# Patient Record
Sex: FEMALE | Race: WHITE | NOT HISPANIC OR LATINO | Employment: OTHER | URBAN - METROPOLITAN AREA
[De-identification: names, ages, dates, MRNs, and addresses within clinical notes are randomized per-mention and may not be internally consistent; named-entity substitution may affect disease eponyms.]

---

## 2017-01-05 ENCOUNTER — GENERIC CONVERSION - ENCOUNTER (OUTPATIENT)
Dept: OTHER | Facility: OTHER | Age: 62
End: 2017-01-05

## 2017-01-05 ENCOUNTER — LAB REQUISITION (OUTPATIENT)
Dept: LAB | Facility: HOSPITAL | Age: 62
End: 2017-01-05
Payer: COMMERCIAL

## 2017-01-05 ENCOUNTER — ALLSCRIPTS OFFICE VISIT (OUTPATIENT)
Dept: OTHER | Facility: OTHER | Age: 62
End: 2017-01-05

## 2017-01-05 ENCOUNTER — APPOINTMENT (OUTPATIENT)
Dept: LAB | Facility: HOSPITAL | Age: 62
End: 2017-01-05
Payer: COMMERCIAL

## 2017-01-05 DIAGNOSIS — N95.0 POSTMENOPAUSAL BLEEDING: ICD-10-CM

## 2017-01-05 DIAGNOSIS — R31.9 HEMATURIA: ICD-10-CM

## 2017-01-05 PROCEDURE — 88112 CYTOPATH CELL ENHANCE TECH: CPT

## 2017-01-05 PROCEDURE — 87147 CULTURE TYPE IMMUNOLOGIC: CPT

## 2017-01-05 PROCEDURE — 88305 TISSUE EXAM BY PATHOLOGIST: CPT | Performed by: PHYSICIAN ASSISTANT

## 2017-01-05 PROCEDURE — 87086 URINE CULTURE/COLONY COUNT: CPT

## 2017-01-06 ENCOUNTER — HOSPITAL ENCOUNTER (OUTPATIENT)
Dept: RADIOLOGY | Facility: HOSPITAL | Age: 62
Discharge: HOME | End: 2017-01-06
Payer: COMMERCIAL

## 2017-01-06 DIAGNOSIS — N95.0 POSTMENOPAUSAL BLEEDING: ICD-10-CM

## 2017-01-06 PROCEDURE — 76830 TRANSVAGINAL US NON-OB: CPT

## 2017-01-06 PROCEDURE — 76856 US EXAM PELVIC COMPLETE: CPT

## 2017-01-08 LAB — BACTERIA UR CULT: NORMAL

## 2017-01-09 ENCOUNTER — GENERIC CONVERSION - ENCOUNTER (OUTPATIENT)
Dept: OTHER | Facility: OTHER | Age: 62
End: 2017-01-09

## 2017-01-12 ENCOUNTER — GENERIC CONVERSION - ENCOUNTER (OUTPATIENT)
Dept: OTHER | Facility: OTHER | Age: 62
End: 2017-01-12

## 2017-01-12 ENCOUNTER — ALLSCRIPTS OFFICE VISIT (OUTPATIENT)
Dept: OTHER | Facility: OTHER | Age: 62
End: 2017-01-12

## 2017-01-13 ENCOUNTER — GENERIC CONVERSION - ENCOUNTER (OUTPATIENT)
Dept: OTHER | Facility: OTHER | Age: 62
End: 2017-01-13

## 2017-04-27 RX ORDER — SENNOSIDES 8.6 MG
650 CAPSULE ORAL EVERY 8 HOURS PRN
COMMUNITY
End: 2020-07-14

## 2017-05-01 ENCOUNTER — ANESTHESIA EVENT (OUTPATIENT)
Dept: PERIOP | Facility: HOSPITAL | Age: 62
End: 2017-05-01
Payer: COMMERCIAL

## 2017-05-02 ENCOUNTER — HOSPITAL ENCOUNTER (OUTPATIENT)
Facility: HOSPITAL | Age: 62
Setting detail: OBSERVATION
Discharge: HOME | End: 2017-05-03
Attending: OBSTETRICS & GYNECOLOGY | Admitting: OBSTETRICS & GYNECOLOGY
Payer: COMMERCIAL

## 2017-05-02 ENCOUNTER — ANESTHESIA (OUTPATIENT)
Dept: PERIOP | Facility: HOSPITAL | Age: 62
End: 2017-05-02
Payer: COMMERCIAL

## 2017-05-02 LAB
ABO GROUP BLD: NORMAL
BLD GP AB SCN SERPL QL: NEGATIVE
EST. AVERAGE GLUCOSE BLD GHB EST-MCNC: 103 MG/DL
GLUCOSE SERPL-MCNC: 97 MG/DL (ref 65–140)
HBA1C MFR BLD: 5.2 % (ref 4.2–6.3)
RH BLD: POSITIVE
SPECIMEN EXPIRATION DATE: NORMAL

## 2017-05-02 PROCEDURE — 86901 BLOOD TYPING SEROLOGIC RH(D): CPT | Performed by: OBSTETRICS & GYNECOLOGY

## 2017-05-02 PROCEDURE — C1781 MESH (IMPLANTABLE): HCPCS | Performed by: OBSTETRICS & GYNECOLOGY

## 2017-05-02 PROCEDURE — C1771 REP DEV, URINARY, W/SLING: HCPCS | Performed by: OBSTETRICS & GYNECOLOGY

## 2017-05-02 PROCEDURE — 83036 HEMOGLOBIN GLYCOSYLATED A1C: CPT | Performed by: OBSTETRICS & GYNECOLOGY

## 2017-05-02 PROCEDURE — 86900 BLOOD TYPING SEROLOGIC ABO: CPT | Performed by: OBSTETRICS & GYNECOLOGY

## 2017-05-02 PROCEDURE — 82948 REAGENT STRIP/BLOOD GLUCOSE: CPT

## 2017-05-02 PROCEDURE — 86850 RBC ANTIBODY SCREEN: CPT | Performed by: OBSTETRICS & GYNECOLOGY

## 2017-05-02 DEVICE — VAGINAL SUPPORT SYSTEM
Type: IMPLANTABLE DEVICE | Site: VAGINA | Status: FUNCTIONAL
Brand: UPHOLD™ LITE

## 2017-05-02 DEVICE — SLING TRNVG MID URETHRAL ADVANTAGE BLUE: Type: IMPLANTABLE DEVICE | Site: VAGINA | Status: FUNCTIONAL

## 2017-05-02 RX ORDER — ONDANSETRON 2 MG/ML
4 INJECTION INTRAMUSCULAR; INTRAVENOUS EVERY 6 HOURS PRN
Status: DISCONTINUED | OUTPATIENT
Start: 2017-05-02 | End: 2017-05-03 | Stop reason: HOSPADM

## 2017-05-02 RX ORDER — FENTANYL CITRATE 50 UG/ML
INJECTION, SOLUTION INTRAMUSCULAR; INTRAVENOUS AS NEEDED
Status: DISCONTINUED | OUTPATIENT
Start: 2017-05-02 | End: 2017-05-02 | Stop reason: SURG

## 2017-05-02 RX ORDER — MIDAZOLAM HYDROCHLORIDE 1 MG/ML
INJECTION INTRAMUSCULAR; INTRAVENOUS AS NEEDED
Status: DISCONTINUED | OUTPATIENT
Start: 2017-05-02 | End: 2017-05-02 | Stop reason: SURG

## 2017-05-02 RX ORDER — PROPOFOL 10 MG/ML
INJECTION, EMULSION INTRAVENOUS CONTINUOUS PRN
Status: DISCONTINUED | OUTPATIENT
Start: 2017-05-02 | End: 2017-05-02 | Stop reason: SURG

## 2017-05-02 RX ORDER — IBUPROFEN 600 MG/1
600 TABLET ORAL EVERY 6 HOURS PRN
Status: DISCONTINUED | OUTPATIENT
Start: 2017-05-02 | End: 2017-05-03 | Stop reason: HOSPADM

## 2017-05-02 RX ORDER — FENTANYL CITRATE 50 UG/ML
50 INJECTION, SOLUTION INTRAMUSCULAR; INTRAVENOUS
Status: DISCONTINUED | OUTPATIENT
Start: 2017-05-02 | End: 2017-05-02 | Stop reason: HOSPADM

## 2017-05-02 RX ORDER — ONDANSETRON 2 MG/ML
4 INJECTION INTRAMUSCULAR; INTRAVENOUS EVERY 6 HOURS PRN
Status: DISCONTINUED | OUTPATIENT
Start: 2017-05-02 | End: 2017-05-02 | Stop reason: HOSPADM

## 2017-05-02 RX ORDER — LIDOCAINE HYDROCHLORIDE AND EPINEPHRINE 20; 5 MG/ML; UG/ML
INJECTION, SOLUTION EPIDURAL; INFILTRATION; INTRACAUDAL; PERINEURAL AS NEEDED
Status: DISCONTINUED | OUTPATIENT
Start: 2017-05-02 | End: 2017-05-02 | Stop reason: SURG

## 2017-05-02 RX ORDER — SODIUM CHLORIDE 9 MG/ML
125 INJECTION, SOLUTION INTRAVENOUS CONTINUOUS
Status: DISCONTINUED | OUTPATIENT
Start: 2017-05-02 | End: 2017-05-03 | Stop reason: HOSPADM

## 2017-05-02 RX ORDER — MAGNESIUM HYDROXIDE 1200 MG/15ML
LIQUID ORAL AS NEEDED
Status: DISCONTINUED | OUTPATIENT
Start: 2017-05-02 | End: 2017-05-02 | Stop reason: HOSPADM

## 2017-05-02 RX ORDER — ONDANSETRON 2 MG/ML
INJECTION INTRAMUSCULAR; INTRAVENOUS AS NEEDED
Status: DISCONTINUED | OUTPATIENT
Start: 2017-05-02 | End: 2017-05-02 | Stop reason: SURG

## 2017-05-02 RX ORDER — DOCUSATE SODIUM 100 MG/1
100 CAPSULE, LIQUID FILLED ORAL 2 TIMES DAILY
Status: DISCONTINUED | OUTPATIENT
Start: 2017-05-02 | End: 2017-05-03 | Stop reason: HOSPADM

## 2017-05-02 RX ORDER — SODIUM CHLORIDE, SODIUM LACTATE, POTASSIUM CHLORIDE, CALCIUM CHLORIDE 600; 310; 30; 20 MG/100ML; MG/100ML; MG/100ML; MG/100ML
125 INJECTION, SOLUTION INTRAVENOUS CONTINUOUS
Status: DISCONTINUED | OUTPATIENT
Start: 2017-05-02 | End: 2017-05-03 | Stop reason: HOSPADM

## 2017-05-02 RX ORDER — ACETAMINOPHEN 325 MG/1
650 TABLET ORAL EVERY 6 HOURS PRN
Status: DISCONTINUED | OUTPATIENT
Start: 2017-05-02 | End: 2017-05-03 | Stop reason: HOSPADM

## 2017-05-02 RX ADMIN — SODIUM CHLORIDE 125 ML/HR: 0.9 INJECTION, SOLUTION INTRAVENOUS at 10:54

## 2017-05-02 RX ADMIN — LIDOCAINE HYDROCHLORIDE AND EPINEPHRINE 5 ML: 20; 5 INJECTION, SOLUTION EPIDURAL; INFILTRATION; INTRACAUDAL; PERINEURAL at 14:28

## 2017-05-02 RX ADMIN — ONDANSETRON HYDROCHLORIDE 4 MG: 2 INJECTION, SOLUTION INTRAVENOUS at 15:30

## 2017-05-02 RX ADMIN — MIDAZOLAM HYDROCHLORIDE 0.5 MG: 1 INJECTION, SOLUTION INTRAMUSCULAR; INTRAVENOUS at 13:34

## 2017-05-02 RX ADMIN — MIDAZOLAM HYDROCHLORIDE 2 MG: 1 INJECTION, SOLUTION INTRAMUSCULAR; INTRAVENOUS at 12:47

## 2017-05-02 RX ADMIN — ONDANSETRON 4 MG: 2 INJECTION INTRAMUSCULAR; INTRAVENOUS at 16:48

## 2017-05-02 RX ADMIN — FENTANYL CITRATE 100 MCG: 50 INJECTION, SOLUTION INTRAMUSCULAR; INTRAVENOUS at 12:47

## 2017-05-02 RX ADMIN — PROPOFOL 100 MCG/KG/MIN: 10 INJECTION, EMULSION INTRAVENOUS at 13:10

## 2017-05-02 RX ADMIN — MIDAZOLAM HYDROCHLORIDE 1 MG: 1 INJECTION, SOLUTION INTRAMUSCULAR; INTRAVENOUS at 13:10

## 2017-05-02 RX ADMIN — CEFAZOLIN SODIUM 1000 MG: 1 SOLUTION INTRAVENOUS at 13:03

## 2017-05-02 RX ADMIN — LIDOCAINE HYDROCHLORIDE AND EPINEPHRINE 5 ML: 20; 5 INJECTION, SOLUTION EPIDURAL; INFILTRATION; INTRACAUDAL; PERINEURAL at 15:13

## 2017-05-02 RX ADMIN — IBUPROFEN 600 MG: 600 TABLET, FILM COATED ORAL at 21:53

## 2017-05-02 RX ADMIN — SODIUM CHLORIDE, SODIUM LACTATE, POTASSIUM CHLORIDE, AND CALCIUM CHLORIDE 125 ML/HR: .6; .31; .03; .02 INJECTION, SOLUTION INTRAVENOUS at 16:22

## 2017-05-02 RX ADMIN — MIDAZOLAM HYDROCHLORIDE 0.5 MG: 1 INJECTION, SOLUTION INTRAMUSCULAR; INTRAVENOUS at 13:27

## 2017-05-02 RX ADMIN — SODIUM CHLORIDE: 0.9 INJECTION, SOLUTION INTRAVENOUS at 13:47

## 2017-05-02 RX ADMIN — DOCUSATE SODIUM 100 MG: 100 CAPSULE, LIQUID FILLED ORAL at 18:09

## 2017-05-03 VITALS
OXYGEN SATURATION: 99 % | BODY MASS INDEX: 18.48 KG/M2 | WEIGHT: 115 LBS | HEART RATE: 84 BPM | DIASTOLIC BLOOD PRESSURE: 78 MMHG | RESPIRATION RATE: 16 BRPM | TEMPERATURE: 98 F | HEIGHT: 66 IN | SYSTOLIC BLOOD PRESSURE: 127 MMHG

## 2017-05-03 PROBLEM — N81.6 RECTOCELE: Status: ACTIVE | Noted: 2017-05-03

## 2017-05-03 PROBLEM — N36.41 HYPERMOBILITY OF URETHRA: Status: ACTIVE | Noted: 2017-05-03

## 2017-05-03 PROBLEM — N81.11 CYSTOCELE, MIDLINE: Status: ACTIVE | Noted: 2017-05-03

## 2017-05-03 LAB
ANION GAP SERPL CALCULATED.3IONS-SCNC: 8 MMOL/L (ref 4–13)
BASOPHILS # BLD AUTO: 0.03 THOUSANDS/ΜL (ref 0–0.1)
BASOPHILS NFR BLD AUTO: 0 % (ref 0–1)
BUN SERPL-MCNC: 14 MG/DL (ref 5–25)
CALCIUM SERPL-MCNC: 9.5 MG/DL (ref 8.3–10.1)
CHLORIDE SERPL-SCNC: 108 MMOL/L (ref 100–108)
CO2 SERPL-SCNC: 25 MMOL/L (ref 21–32)
CREAT SERPL-MCNC: 0.55 MG/DL (ref 0.6–1.3)
EOSINOPHIL # BLD AUTO: 0.01 THOUSAND/ΜL (ref 0–0.61)
EOSINOPHIL NFR BLD AUTO: 0 % (ref 0–6)
ERYTHROCYTE [DISTWIDTH] IN BLOOD BY AUTOMATED COUNT: 13.1 % (ref 11.6–15.1)
GFR SERPL CREATININE-BSD FRML MDRD: >60 ML/MIN/1.73SQ M
GLUCOSE SERPL-MCNC: 104 MG/DL (ref 65–140)
HCT VFR BLD AUTO: 35.8 % (ref 34.8–46.1)
HGB BLD-MCNC: 11.5 G/DL (ref 11.5–15.4)
LYMPHOCYTES # BLD AUTO: 1.54 THOUSANDS/ΜL (ref 0.6–4.47)
LYMPHOCYTES NFR BLD AUTO: 16 % (ref 14–44)
MCH RBC QN AUTO: 30.8 PG (ref 26.8–34.3)
MCHC RBC AUTO-ENTMCNC: 32.1 G/DL (ref 31.4–37.4)
MCV RBC AUTO: 96 FL (ref 82–98)
MONOCYTES # BLD AUTO: 1.04 THOUSAND/ΜL (ref 0.17–1.22)
MONOCYTES NFR BLD AUTO: 11 % (ref 4–12)
NEUTROPHILS # BLD AUTO: 7.25 THOUSANDS/ΜL (ref 1.85–7.62)
NEUTS SEG NFR BLD AUTO: 73 % (ref 43–75)
NRBC BLD AUTO-RTO: 0 /100 WBCS
PLATELET # BLD AUTO: 160 THOUSANDS/UL (ref 149–390)
PMV BLD AUTO: 12.8 FL (ref 8.9–12.7)
POTASSIUM SERPL-SCNC: 4.2 MMOL/L (ref 3.5–5.3)
RBC # BLD AUTO: 3.73 MILLION/UL (ref 3.81–5.12)
SODIUM SERPL-SCNC: 141 MMOL/L (ref 136–145)
WBC # BLD AUTO: 9.87 THOUSAND/UL (ref 4.31–10.16)

## 2017-05-03 PROCEDURE — 80048 BASIC METABOLIC PNL TOTAL CA: CPT | Performed by: OBSTETRICS & GYNECOLOGY

## 2017-05-03 PROCEDURE — 85025 COMPLETE CBC W/AUTO DIFF WBC: CPT | Performed by: OBSTETRICS & GYNECOLOGY

## 2017-05-03 RX ADMIN — IBUPROFEN 600 MG: 600 TABLET, FILM COATED ORAL at 05:53

## 2017-05-03 RX ADMIN — SODIUM CHLORIDE, SODIUM LACTATE, POTASSIUM CHLORIDE, AND CALCIUM CHLORIDE 125 ML/HR: .6; .31; .03; .02 INJECTION, SOLUTION INTRAVENOUS at 00:18

## 2017-05-03 RX ADMIN — DOCUSATE SODIUM 100 MG: 100 CAPSULE, LIQUID FILLED ORAL at 09:00

## 2018-01-12 VITALS
SYSTOLIC BLOOD PRESSURE: 124 MMHG | WEIGHT: 121 LBS | BODY MASS INDEX: 20.16 KG/M2 | HEIGHT: 65 IN | DIASTOLIC BLOOD PRESSURE: 78 MMHG

## 2018-01-12 NOTE — MISCELLANEOUS
Message   Recorded as Task   Date: 01/09/2017 12:01 PM, Created By: Suki Lawrence   Task Name: Go to Result   Assigned To: Ebony Munroe   Regarding Patient: Vikki Jo, Status: Active   Comment:    MessiMimi paredes - 09 Jan 2017 12:01 PM     TASK CREATED  please let Catalino Dc know that her pelvic ultrasound is normal - the lining of her uterus is very thin, which is what we wanted to find  I am still awaiting her endometrial biopsy results and urine testing  Tracy Hewitt - 09 Jan 2017 12:54 PM     TASK EDITED  I lm of this for pt        Active Problems    1  Encounter for gynecological examination without abnormal finding (V72 31) (Z01 419)   2  Encounter for screening mammogram for malignant neoplasm of breast (V76 12)   (Z12 31)   3  Hematuria (599 70) (R31 9)   4  Postmenopausal Atrophic Vaginitis (627 3)   5  Postmenopausal bleeding (627 1) (N95 0)    Current Meds   1  Osphena 60 MG Oral Tablet; Take 1 tablet daily; Therapy: 81Xty2718 to (Evaluate:55Sbd9585)  Requested for: 13Khh0725; Last   Rx:[id number] Ordered    Allergies    1  No Known Drug Allergies    Signatures   Electronically signed by :  Yancy Huggins, ; Jan 9 2017 12:54PM EST                       (Author)

## 2018-01-13 VITALS
BODY MASS INDEX: 19.83 KG/M2 | SYSTOLIC BLOOD PRESSURE: 116 MMHG | DIASTOLIC BLOOD PRESSURE: 68 MMHG | WEIGHT: 119 LBS | HEIGHT: 65 IN

## 2018-01-14 NOTE — MISCELLANEOUS
Message   Recorded as Task   Date: 01/09/2017 12:44 PM, Created By: Amberly Loyola   Task Name: Go to Result   Assigned To: ABDIRASHID GYN,Team   Regarding Patient: Aisha Carbajal, Status: In Progress   Comment:    Mimi Chamberlain - 09 Jan 2017 12:44 PM     TASK CREATED  marlene's urine testing is all normal  Please let her know  She should let me know if she has any further bleeding  Thanks! Marco Faith - 09 Jan 2017 1:10 PM     TASK IN PROGRESS   Marco Faith - 09 Jan 2017 1:11 PM     TASK EDITED  I lm of this also        Active Problems    1  Encounter for gynecological examination without abnormal finding (V72 31) (Z01 419)   2  Encounter for screening mammogram for malignant neoplasm of breast (V76 12)   (Z12 31)   3  Hematuria (599 70) (R31 9)   4  Postmenopausal Atrophic Vaginitis (627 3)   5  Postmenopausal bleeding (627 1) (N95 0)    Current Meds   1  Osphena 60 MG Oral Tablet; Take 1 tablet daily; Therapy: 72Cta1451 to (Evaluate:90Epn6889)  Requested for: 18Gca8270; Last   Rx:[id number] Ordered    Allergies    1  No Known Drug Allergies    Signatures   Electronically signed by :  Carmenza Huggins, ; Jan 9 2017  1:11PM EST                       (Author)

## 2018-01-15 NOTE — MISCELLANEOUS
Message   Recorded as Task   Date: 01/12/2017 03:06 PM, Created By: Clinton Cash   Task Name: Care Coordination   Assigned To: ABDIRASHID GYN,Team   Regarding Patient: Yesenia Wing, Status: In Progress   Comment:    Clinton Cash - 12 Jan 2017 3:06 PM     TASK CREATED  pt called and said pessary fell out   Scot Cipro - 12 Jan 2017 3:06 PM     TASK IN PROGRESS   Scot Cipro - 12 Jan 2017 3:13 PM     TASK EDITED  Per Anabel Barron, pt will try to push prolapse back in and reinsert pessary  I gave her instructions and she is willing to try  If it falls out again, pt willing to have another pessary reinserted tomorrow  Pt is seeing someone in [facility] office   His # is [phone]  [facility] may call to see if pt can be seen sooner  Scot Cipro - 12 Jan 2017 3:14 PM     TASK EDITED  Nurses note made - not sure if you want to call Mimi Valentin - 13 Jan 2017 8:25 AM     TASK REPLIED TO: Previously Assigned To Mimi Chamberlain  can you please let pt know that I got her in for a 2pm appt on Monday - same location and same doctor at [place] [address] - and she needs to arrive early and bring her completed paperwork with her  Thanks! Evelyn Patrick - 13 Jan 2017 10:22 AM     TASK EDITED  pt is informed        Active Problems    1  Cystocele (618 01) (N81 10)   2  Encounter for gynecological examination without abnormal finding (V72 31) (Z01 419)   3  Encounter for screening mammogram for malignant neoplasm of breast (V76 12)   (Z12 31)   4  Hematuria (599 70) (R31 9)   5  Postmenopausal Atrophic Vaginitis (627 3)   6  Postmenopausal bleeding (627 1) (N95 0)    Current Meds   1  Osphena 60 MG Oral Tablet; Take 1 tablet daily; Therapy: 14Ccg9160 to (Evaluate:35Oqf8301)  Requested for: 13Hnh3244; Last   Rx:[id number] Ordered    Allergies    1   No Known Drug Allergies    Signatures   Electronically signed by : Stefan Ritchie, ; Jan 13 2017 10:22AM EST                       (Author)

## 2018-01-16 NOTE — MISCELLANEOUS
Message   Recorded as Task   Date: 01/12/2017 03:06 PM, Created By: Juan Ayala   Task Name: Care Coordination   Assigned To: ABDIRASHID GYN,Team   Regarding Patient: Rigo Chester, Status: In Progress   Comment:    Juan Ayala - 12 Jan 2017 3:06 PM     TASK CREATED  pt called and said pessary fell out   Juan Ayala - 12 Jan 2017 3:06 PM     TASK IN PROGRESS   Juan Ayala - 12 Jan 2017 3:13 PM     TASK EDITED  Per Lorenza Browne, pt will try to push prolapse back in and reinsert pessary  I gave her instructions and she is willing to try  If it falls out again, pt willing to have another pessary reinserted tomorrow  Pt is seeing someone in [facility] office   His # is [phone]  [facility] may call to see if pt can be seen sooner  Active Problems    1  Cystocele (618 01) (N81 10)   2  Encounter for gynecological examination without abnormal finding (V72 31) (Z01 419)   3  Encounter for screening mammogram for malignant neoplasm of breast (V76 12)   (Z12 31)   4  Hematuria (599 70) (R31 9)   5  Postmenopausal Atrophic Vaginitis (627 3)   6  Postmenopausal bleeding (627 1) (N95 0)    Current Meds   1  Osphena 60 MG Oral Tablet; Take 1 tablet daily; Therapy: 93Zqf6598 to (Evaluate:18Jbn7160)  Requested for: 13Rgu2413; Last   Rx:[id number] Ordered    Allergies    1  No Known Drug Allergies    Signatures   Electronically signed by :  Leslie Huggins, ; Jan 12 2017  3:13PM EST                       (Author)

## 2018-01-16 NOTE — MISCELLANEOUS
Message   Recorded as Task   Date: 01/05/2017 08:20 AM, Created By: Ruben Pascual   Task Name: Call Back   Assigned To: ABDIRASHID GYN,Team   Regarding Patient: Korin Farrell, Status: In Progress   Comment:    Rea Samson - 05 Jan 2017 8:20 AM     TASK CREATED  Caller: Self; Other; [phone] (Home)  w87 is prov,  pt started spotting mon 1/2/17, has low abdominal pressure, bleeding heavy yesterday & today,  pls call pt @ [address] [place] [address] - 05 Jan 2017 8:41 AM     TASK IN PROGRESS   Romeo Burciaga - 05 Jan 2017 8:50 AM     TASK EDITED  Pt had spotting on Mon and Tues - heavier bleeding off and on yesterday and today  Pts prolapse is hanging out  I gave her an ov - not sure if I should have gotten u/s and waited for ov??? Sorry        Active Problems    1  Encounter for gynecological examination without abnormal finding (V72 31) (Z01 419)   2  Encounter for screening mammogram for malignant neoplasm of breast (V76 12)   (Z12 31)   3  Postmenopausal Atrophic Vaginitis (627 3)    Current Meds   1  Osphena 60 MG Oral Tablet; Take 1 tablet daily; Therapy: 75Mew0226 to (Evaluate:45Toy4659)  Requested for: 46Vpj0061; Last   Rx:[id number] Ordered    Allergies    1  No Known Drug Allergies    Signatures   Electronically signed by :  Peg Huggins, ; Jan 5 2017 10:09AM EST                       (Author)

## 2018-01-17 NOTE — MISCELLANEOUS
Message   Recorded as Task   Date: 01/05/2017 08:20 AM, Created By: Kevin Monson   Task Name: Call Back   Assigned To: ABDIRASHID GYN,Team   Regarding Patient: Nicholas Graves, Status: In Progress   Comment:    Rea Samson - 05 Jan 2017 8:20 AM     TASK CREATED  Caller: Self; Other; [phone] (Home)  w87 is prov,  pt started spotting mon 1/2/17, has low abdominal pressure, bleeding heavy yesterday & today,  pls call pt @ [address] [place] [address] - 05 Jan 2017 8:41 AM     TASK IN PROGRESS   Valdemar Rodriguez - 05 Jan 2017 8:50 AM     TASK EDITED  Pt had spotting on Mon and Tues - heavier bleeding off and on yesterday and today  Pts prolapse is hanging out  I gave her an ov - not sure if I should have gotten u/s and waited for ov??? Sorry        Active Problems    1  Encounter for gynecological examination without abnormal finding (V72 31) (Z01 419)   2  Encounter for screening mammogram for malignant neoplasm of breast (V76 12)   (Z12 31)   3  Postmenopausal Atrophic Vaginitis (627 3)    Current Meds   1  Osphena 60 MG Oral Tablet; Take 1 tablet daily; Therapy: 31Okc2162 to (Evaluate:16Nin9793)  Requested for: 00Odq1118; Last   Rx:[id number] Ordered    Allergies    1  No Known Drug Allergies    Signatures   Electronically signed by :  Judith Huggins, ; Jan 5 2017  8:50AM EST                       (Author)

## 2018-08-14 ENCOUNTER — TRANSCRIBE ORDERS (OUTPATIENT)
Dept: ADMINISTRATIVE | Facility: HOSPITAL | Age: 63
End: 2018-08-14

## 2018-08-14 ENCOUNTER — APPOINTMENT (OUTPATIENT)
Dept: LAB | Facility: HOSPITAL | Age: 63
End: 2018-08-14
Payer: COMMERCIAL

## 2018-08-14 DIAGNOSIS — Z13.9 SCREENING FOR CONDITION: ICD-10-CM

## 2018-08-14 DIAGNOSIS — R53.83 FATIGUE, UNSPECIFIED TYPE: ICD-10-CM

## 2018-08-14 DIAGNOSIS — Z13.9 SCREENING FOR CONDITION: Primary | ICD-10-CM

## 2018-08-14 LAB
25(OH)D3 SERPL-MCNC: 38.3 NG/ML (ref 30–100)
ALBUMIN SERPL BCP-MCNC: 4.3 G/DL (ref 3–5.2)
ALP SERPL-CCNC: 97 U/L (ref 43–122)
ALT SERPL W P-5'-P-CCNC: 26 U/L (ref 9–52)
ANION GAP SERPL CALCULATED.3IONS-SCNC: 6 MMOL/L (ref 5–14)
AST SERPL W P-5'-P-CCNC: 26 U/L (ref 14–36)
BASOPHILS # BLD AUTO: 0.1 THOUSANDS/ΜL (ref 0–0.1)
BASOPHILS NFR BLD AUTO: 1 % (ref 0–1)
BILIRUB SERPL-MCNC: 0.4 MG/DL
BUN SERPL-MCNC: 19 MG/DL (ref 5–25)
CALCIUM ALBUM COR SERPL-MCNC: 10.7 MG/DL (ref 8.3–10.1)
CALCIUM SERPL-MCNC: 10.9 MG/DL (ref 8.4–10.2)
CHLORIDE SERPL-SCNC: 103 MMOL/L (ref 97–108)
CHOLEST SERPL-MCNC: 175 MG/DL
CO2 SERPL-SCNC: 32 MMOL/L (ref 22–30)
CREAT SERPL-MCNC: 0.67 MG/DL (ref 0.6–1.2)
EOSINOPHIL # BLD AUTO: 0.1 THOUSAND/ΜL (ref 0–0.4)
EOSINOPHIL NFR BLD AUTO: 2 % (ref 0–6)
ERYTHROCYTE [DISTWIDTH] IN BLOOD BY AUTOMATED COUNT: 12.2 %
GFR SERPL CREATININE-BSD FRML MDRD: 95 ML/MIN/1.73SQ M
GLUCOSE P FAST SERPL-MCNC: 91 MG/DL (ref 70–99)
HCT VFR BLD AUTO: 43.6 % (ref 36–46)
HDLC SERPL-MCNC: 48 MG/DL (ref 40–59)
HGB BLD-MCNC: 14.5 G/DL (ref 12–16)
LDLC SERPL CALC-MCNC: 95 MG/DL
LYMPHOCYTES # BLD AUTO: 1.9 THOUSANDS/ΜL (ref 0.5–4)
LYMPHOCYTES NFR BLD AUTO: 29 % (ref 20–50)
MCH RBC QN AUTO: 31.9 PG (ref 26–34)
MCHC RBC AUTO-ENTMCNC: 33.2 G/DL (ref 31–36)
MCV RBC AUTO: 96 FL (ref 80–100)
MONOCYTES # BLD AUTO: 0.5 THOUSAND/ΜL (ref 0.2–0.9)
MONOCYTES NFR BLD AUTO: 8 % (ref 1–10)
NEUTROPHILS # BLD AUTO: 3.8 THOUSANDS/ΜL (ref 1.8–7.8)
NEUTS SEG NFR BLD AUTO: 60 % (ref 45–65)
NONHDLC SERPL-MCNC: 127 MG/DL
PLATELET # BLD AUTO: 177 THOUSANDS/UL (ref 150–450)
PLATELET BLD QL SMEAR: ADEQUATE
PMV BLD AUTO: 11.5 FL (ref 8.9–12.7)
POTASSIUM SERPL-SCNC: 5.2 MMOL/L (ref 3.6–5)
PROT SERPL-MCNC: 7.7 G/DL (ref 5.9–8.4)
RBC # BLD AUTO: 4.55 MILLION/UL (ref 4–5.2)
RBC MORPH BLD: NORMAL
SODIUM SERPL-SCNC: 141 MMOL/L (ref 137–147)
TRIGL SERPL-MCNC: 158 MG/DL
TSH SERPL DL<=0.05 MIU/L-ACNC: 0.7 UIU/ML (ref 0.47–4.68)
WBC # BLD AUTO: 6.4 THOUSAND/UL (ref 4.5–11)

## 2018-08-14 PROCEDURE — 85025 COMPLETE CBC W/AUTO DIFF WBC: CPT

## 2018-08-14 PROCEDURE — 80061 LIPID PANEL: CPT

## 2018-08-14 PROCEDURE — 36415 COLL VENOUS BLD VENIPUNCTURE: CPT

## 2018-08-14 PROCEDURE — 80053 COMPREHEN METABOLIC PANEL: CPT

## 2018-08-14 PROCEDURE — 84443 ASSAY THYROID STIM HORMONE: CPT

## 2018-08-14 PROCEDURE — 82306 VITAMIN D 25 HYDROXY: CPT

## 2018-09-21 ENCOUNTER — TRANSCRIBE ORDERS (OUTPATIENT)
Dept: ADMINISTRATIVE | Facility: HOSPITAL | Age: 63
End: 2018-09-21

## 2018-09-21 ENCOUNTER — APPOINTMENT (OUTPATIENT)
Dept: LAB | Facility: HOSPITAL | Age: 63
End: 2018-09-21
Payer: COMMERCIAL

## 2018-09-21 DIAGNOSIS — E83.52 HYPERCALCEMIA: Primary | ICD-10-CM

## 2018-09-21 DIAGNOSIS — E83.52 HYPERCALCEMIA: ICD-10-CM

## 2018-09-21 LAB — PTH-INTACT SERPL-MCNC: 113.6 PG/ML (ref 16.7–78.9)

## 2018-09-21 PROCEDURE — 36415 COLL VENOUS BLD VENIPUNCTURE: CPT

## 2018-09-21 PROCEDURE — 83970 ASSAY OF PARATHORMONE: CPT

## 2020-07-10 DIAGNOSIS — Z11.59 SCREENING FOR VIRAL DISEASE: ICD-10-CM

## 2020-07-10 PROCEDURE — U0003 INFECTIOUS AGENT DETECTION BY NUCLEIC ACID (DNA OR RNA); SEVERE ACUTE RESPIRATORY SYNDROME CORONAVIRUS 2 (SARS-COV-2) (CORONAVIRUS DISEASE [COVID-19]), AMPLIFIED PROBE TECHNIQUE, MAKING USE OF HIGH THROUGHPUT TECHNOLOGIES AS DESCRIBED BY CMS-2020-01-R: HCPCS

## 2020-07-11 LAB — SARS-COV-2 RNA SPEC QL NAA+PROBE: NOT DETECTED

## 2020-07-14 RX ORDER — ACETAMINOPHEN 500 MG
500-1000 TABLET ORAL EVERY 6 HOURS PRN
COMMUNITY

## 2020-07-14 RX ORDER — LEVOTHYROXINE SODIUM 0.03 MG/1
25 TABLET ORAL DAILY
COMMUNITY

## 2020-07-14 NOTE — PRE-PROCEDURE INSTRUCTIONS
Pre-Surgery Instructions:   Medication Instructions    acetaminophen (TYLENOL) 500 mg tablet Instructed patient per Anesthesia Guidelines   Calcium Carbonate-Vitamin D (OSCAL 500/200 D-3 PO) Instructed patient per Anesthesia Guidelines   levothyroxine 25 mcg tablet Instructed patient per Anesthesia Guidelines  Instructed to take levothyroxine am of surgery with sip of water per anesthesia

## 2020-07-16 ENCOUNTER — ANESTHESIA EVENT (OUTPATIENT)
Dept: PERIOP | Facility: HOSPITAL | Age: 65
End: 2020-07-16
Payer: COMMERCIAL

## 2020-07-17 ENCOUNTER — HOSPITAL ENCOUNTER (OUTPATIENT)
Dept: RADIOLOGY | Facility: HOSPITAL | Age: 65
Setting detail: OUTPATIENT SURGERY
Discharge: HOME | End: 2020-07-17
Payer: COMMERCIAL

## 2020-07-17 ENCOUNTER — APPOINTMENT (OUTPATIENT)
Dept: RADIOLOGY | Facility: HOSPITAL | Age: 65
End: 2020-07-17
Payer: COMMERCIAL

## 2020-07-17 ENCOUNTER — ANESTHESIA (OUTPATIENT)
Dept: PERIOP | Facility: HOSPITAL | Age: 65
End: 2020-07-17
Payer: COMMERCIAL

## 2020-07-17 ENCOUNTER — HOSPITAL ENCOUNTER (OUTPATIENT)
Facility: HOSPITAL | Age: 65
Setting detail: OUTPATIENT SURGERY
Discharge: HOME | End: 2020-07-18
Attending: PODIATRIST | Admitting: PODIATRIST
Payer: COMMERCIAL

## 2020-07-17 ENCOUNTER — APPOINTMENT (OUTPATIENT)
Dept: CT IMAGING | Facility: HOSPITAL | Age: 65
End: 2020-07-17
Payer: COMMERCIAL

## 2020-07-17 DIAGNOSIS — M20.11 HALLUX VALGUS (ACQUIRED), RIGHT FOOT: ICD-10-CM

## 2020-07-17 DIAGNOSIS — Z98.890 POST-OPERATIVE STATE: ICD-10-CM

## 2020-07-17 DIAGNOSIS — Z11.59 SCREENING FOR VIRAL DISEASE: Primary | ICD-10-CM

## 2020-07-17 LAB
PLATELET # BLD AUTO: 205 THOUSANDS/UL (ref 149–390)
PMV BLD AUTO: 11.1 FL (ref 8.9–12.7)

## 2020-07-17 PROCEDURE — C1713 ANCHOR/SCREW BN/BN,TIS/BN: HCPCS | Performed by: PODIATRIST

## 2020-07-17 PROCEDURE — 73630 X-RAY EXAM OF FOOT: CPT

## 2020-07-17 PROCEDURE — 70450 CT HEAD/BRAIN W/O DYE: CPT

## 2020-07-17 PROCEDURE — C1776 JOINT DEVICE (IMPLANTABLE): HCPCS | Performed by: PODIATRIST

## 2020-07-17 PROCEDURE — 85049 AUTOMATED PLATELET COUNT: CPT | Performed by: STUDENT IN AN ORGANIZED HEALTH CARE EDUCATION/TRAINING PROGRAM

## 2020-07-17 PROCEDURE — 83970 ASSAY OF PARATHORMONE: CPT | Performed by: STUDENT IN AN ORGANIZED HEALTH CARE EDUCATION/TRAINING PROGRAM

## 2020-07-17 PROCEDURE — 73620 X-RAY EXAM OF FOOT: CPT

## 2020-07-17 DEVICE — BONE SCREW
Type: IMPLANTABLE DEVICE | Site: FOOT | Status: FUNCTIONAL
Brand: VARIAX

## 2020-07-17 DEVICE — TWIST-OFF SCREW
Type: IMPLANTABLE DEVICE | Site: FOOT | Status: FUNCTIONAL
Brand: FIXOS

## 2020-07-17 DEVICE — POLYAXIAL LOCKING PLATE -  LAPIDUS CROSS-PLATE, RIGHT (T10)
Type: IMPLANTABLE DEVICE | Site: FOOT | Status: FUNCTIONAL
Brand: ANCHORAGE

## 2020-07-17 DEVICE — LOCKING SCREW
Type: IMPLANTABLE DEVICE | Site: FOOT | Status: FUNCTIONAL
Brand: VARIAX

## 2020-07-17 DEVICE — HAMMERTOE IMPLANT, MEDIUM
Type: IMPLANTABLE DEVICE | Site: FOOT | Status: FUNCTIONAL
Brand: TOETAC

## 2020-07-17 DEVICE — CP LAG SCREW (T10)
Type: IMPLANTABLE DEVICE | Site: FOOT | Status: FUNCTIONAL
Brand: ANCHORAGE

## 2020-07-17 RX ORDER — PROPOFOL 10 MG/ML
INJECTION, EMULSION INTRAVENOUS AS NEEDED
Status: DISCONTINUED | OUTPATIENT
Start: 2020-07-17 | End: 2020-07-17 | Stop reason: SURG

## 2020-07-17 RX ORDER — SODIUM CHLORIDE 9 MG/ML
100 INJECTION, SOLUTION INTRAVENOUS CONTINUOUS
Status: DISCONTINUED | OUTPATIENT
Start: 2020-07-17 | End: 2020-07-18 | Stop reason: HOSPADM

## 2020-07-17 RX ORDER — METOCLOPRAMIDE HYDROCHLORIDE 5 MG/ML
10 INJECTION INTRAMUSCULAR; INTRAVENOUS ONCE AS NEEDED
Status: DISCONTINUED | OUTPATIENT
Start: 2020-07-17 | End: 2020-07-18 | Stop reason: HOSPADM

## 2020-07-17 RX ORDER — KETAMINE HYDROCHLORIDE 50 MG/ML
INJECTION, SOLUTION, CONCENTRATE INTRAMUSCULAR; INTRAVENOUS AS NEEDED
Status: DISCONTINUED | OUTPATIENT
Start: 2020-07-17 | End: 2020-07-17 | Stop reason: SURG

## 2020-07-17 RX ORDER — KETOROLAC TROMETHAMINE 30 MG/ML
INJECTION, SOLUTION INTRAMUSCULAR; INTRAVENOUS AS NEEDED
Status: DISCONTINUED | OUTPATIENT
Start: 2020-07-17 | End: 2020-07-17 | Stop reason: SURG

## 2020-07-17 RX ORDER — PROMETHAZINE HYDROCHLORIDE 25 MG/ML
6.25 INJECTION, SOLUTION INTRAMUSCULAR; INTRAVENOUS ONCE AS NEEDED
Status: DISCONTINUED | OUTPATIENT
Start: 2020-07-17 | End: 2020-07-18 | Stop reason: HOSPADM

## 2020-07-17 RX ORDER — SODIUM CHLORIDE 9 MG/ML
125 INJECTION, SOLUTION INTRAVENOUS CONTINUOUS
Status: DISCONTINUED | OUTPATIENT
Start: 2020-07-17 | End: 2020-07-18 | Stop reason: HOSPADM

## 2020-07-17 RX ORDER — DEXAMETHASONE SODIUM PHOSPHATE 4 MG/ML
INJECTION, SOLUTION INTRA-ARTICULAR; INTRALESIONAL; INTRAMUSCULAR; INTRAVENOUS; SOFT TISSUE AS NEEDED
Status: DISCONTINUED | OUTPATIENT
Start: 2020-07-17 | End: 2020-07-17 | Stop reason: SURG

## 2020-07-17 RX ORDER — ONDANSETRON 2 MG/ML
INJECTION INTRAMUSCULAR; INTRAVENOUS AS NEEDED
Status: DISCONTINUED | OUTPATIENT
Start: 2020-07-17 | End: 2020-07-17 | Stop reason: SURG

## 2020-07-17 RX ORDER — BUPIVACAINE HYDROCHLORIDE 5 MG/ML
INJECTION, SOLUTION PERINEURAL AS NEEDED
Status: DISCONTINUED | OUTPATIENT
Start: 2020-07-17 | End: 2020-07-17 | Stop reason: HOSPADM

## 2020-07-17 RX ORDER — HYDROMORPHONE HCL/PF 1 MG/ML
0.5 SYRINGE (ML) INJECTION
Status: DISCONTINUED | OUTPATIENT
Start: 2020-07-17 | End: 2020-07-17 | Stop reason: HOSPADM

## 2020-07-17 RX ORDER — ONDANSETRON 2 MG/ML
4 INJECTION INTRAMUSCULAR; INTRAVENOUS ONCE AS NEEDED
Status: DISCONTINUED | OUTPATIENT
Start: 2020-07-17 | End: 2020-07-17 | Stop reason: HOSPADM

## 2020-07-17 RX ORDER — EPHEDRINE SULFATE 50 MG/ML
INJECTION INTRAVENOUS AS NEEDED
Status: DISCONTINUED | OUTPATIENT
Start: 2020-07-17 | End: 2020-07-17 | Stop reason: SURG

## 2020-07-17 RX ORDER — FENTANYL CITRATE/PF 50 MCG/ML
25 SYRINGE (ML) INJECTION
Status: DISCONTINUED | OUTPATIENT
Start: 2020-07-17 | End: 2020-07-17 | Stop reason: HOSPADM

## 2020-07-17 RX ORDER — SCOLOPAMINE TRANSDERMAL SYSTEM 1 MG/1
1 PATCH, EXTENDED RELEASE TRANSDERMAL ONCE AS NEEDED
Status: DISCONTINUED | OUTPATIENT
Start: 2020-07-17 | End: 2020-07-18

## 2020-07-17 RX ORDER — CEFAZOLIN SODIUM 1 G/50ML
1000 SOLUTION INTRAVENOUS ONCE
Status: COMPLETED | OUTPATIENT
Start: 2020-07-17 | End: 2020-07-17

## 2020-07-17 RX ORDER — ACETAMINOPHEN 325 MG/1
650 TABLET ORAL EVERY 6 HOURS PRN
Status: DISCONTINUED | OUTPATIENT
Start: 2020-07-17 | End: 2020-07-18 | Stop reason: HOSPADM

## 2020-07-17 RX ORDER — MAGNESIUM HYDROXIDE 1200 MG/15ML
LIQUID ORAL AS NEEDED
Status: DISCONTINUED | OUTPATIENT
Start: 2020-07-17 | End: 2020-07-17 | Stop reason: HOSPADM

## 2020-07-17 RX ORDER — MIDAZOLAM HYDROCHLORIDE 2 MG/2ML
INJECTION, SOLUTION INTRAMUSCULAR; INTRAVENOUS AS NEEDED
Status: DISCONTINUED | OUTPATIENT
Start: 2020-07-17 | End: 2020-07-17 | Stop reason: SURG

## 2020-07-17 RX ADMIN — SCOPALAMINE 1 PATCH: 1 PATCH, EXTENDED RELEASE TRANSDERMAL at 12:16

## 2020-07-17 RX ADMIN — DEXAMETHASONE SODIUM PHOSPHATE 4 MG: 4 INJECTION, SOLUTION INTRAMUSCULAR; INTRAVENOUS at 16:26

## 2020-07-17 RX ADMIN — CEFAZOLIN SODIUM 1000 MG: 1 SOLUTION INTRAVENOUS at 17:35

## 2020-07-17 RX ADMIN — ONDANSETRON 4 MG: 2 INJECTION INTRAMUSCULAR; INTRAVENOUS at 16:26

## 2020-07-17 RX ADMIN — KETOROLAC TROMETHAMINE 30 MG: 30 INJECTION, SOLUTION INTRAMUSCULAR at 16:26

## 2020-07-17 RX ADMIN — EPHEDRINE SULFATE 5 MG: 50 INJECTION, SOLUTION INTRAVENOUS at 17:06

## 2020-07-17 RX ADMIN — PROPOFOL 200 MG: 10 INJECTION, EMULSION INTRAVENOUS at 14:10

## 2020-07-17 RX ADMIN — MIDAZOLAM 4 MG: 1 INJECTION INTRAMUSCULAR; INTRAVENOUS at 13:54

## 2020-07-17 RX ADMIN — KETAMINE HYDROCHLORIDE 25 MG: 50 INJECTION INTRAMUSCULAR; INTRAVENOUS at 14:11

## 2020-07-17 RX ADMIN — SODIUM CHLORIDE 125 ML/HR: 0.9 INJECTION, SOLUTION INTRAVENOUS at 12:17

## 2020-07-17 RX ADMIN — SODIUM CHLORIDE 100 ML/HR: 0.9 INJECTION, SOLUTION INTRAVENOUS at 22:37

## 2020-07-17 RX ADMIN — KETAMINE HYDROCHLORIDE 25 MG: 50 INJECTION INTRAMUSCULAR; INTRAVENOUS at 13:54

## 2020-07-17 RX ADMIN — CEFAZOLIN SODIUM 1000 MG: 1 SOLUTION INTRAVENOUS at 13:54

## 2020-07-17 NOTE — OP NOTE
OPERATIVE REPORT - Podiatry  PATIENT NAME: Doren Schaumann    :  1955  MRN: [id number]  Pt Location: [facility] OR ROOM 04    SURGERY DATE: 2020    Surgeon(s) and Role:     * Raymon Garcia DPM - Primary     * Surinder Staton DPM - Assisting    Pre-op Diagnosis:  Hallux valgus (acquired), right foot [M20 11]  Bunion of right foot [M21 611]  Other hammer toe(s) (acquired), right foot [M20 41]    Post-Op Diagnosis Codes:     * Hallux valgus (acquired), right foot [M20 11]     * Bunion of right foot [M21 611]     * Other hammer toe(s) (acquired), right foot [M20 41]    Procedure(s) (LRB):  LAPIDIS FUSION T M T J , W/LATERAL RELEASE AT 1st MPJ (Right)  2nd MET WEIL OSTEOTOMY (Right)  2nd HAMMERTOE PIPJ ARTHODESIS (Right)      Specimen(s):  * No specimens in log *    Estimated Blood Loss:   50 mL    Drains:  * No LDAs found *    Implants:  Implant Name Type Inv   Item Serial No   Lot No  LRB No  Used Action   PLATE ANCHORAGE 2 LAPIDUS CP RT - [id number]  PLATE ANCHORAGE 2 LAPIDUS CP RT  [employer]  Right 1 Implanted   SCREW LAG 4 1 X 26MM - [id number]  SCREW LAG 4 1 X 26MM  [employer]  Right 1 Implanted   SCREW 3 5 X 14MM T10 FLLY TRHD - [id number]  SCREW 3 5 X 14MM T10 FLLY TRHD  [employer]  Right 1 Implanted   SCREW 3 5 X 16MM T10 FLLY TRHD - [id number]  SCREW 3 5 X 16MM T10 FLLY TRHD  [employer]  Right 1 Implanted   SCREW 3 5 X 18MM LCK T10 FLLY THRD - [id number]  SCREW 3 5 X 18MM LCK T10 FLLY THRD  [employer]  Right 1 Implanted   SCREW 3 5 X 14MM LCK T10 FLLY THRD - [id number]  SCREW 3 5 X 14MM LCK T10 FLLY THRD  [employer]  Right 1 Implanted   SCREW SNAP OFF 2 X 12MM - [id number] Screw SCREW SNAP OFF 2 X 12MM  [employer]  Right 1 Implanted   IMPLANT HAMMERTOE MED TOE TAC - [id number]  IMPLANT HAMMERTOE MED TOE TAC  [employer] [id number] Right 1 Implanted       Anesthesia Type:   IV Sedation with Anesthesia with 20 ml of 1% Lidocaine and 0 5% Bupivacaine in a 1:1 mixture 10 cc's 0 5 % marcaine plain at post op block    Hemostasis:  Surgical dissection  Pneumatic ankle tourniquet placed for 120 minutes    Materials:  See above plus  Vicryl and monocryl suture    Operative Findings:  1st and 2nd metatarsal catilage intact  Complications:   None    Procedure and Technique:     Under mild sedation, the patient was brought into the operating room and placed on the operating room table in the supine position  A pneumatic tourniquet was then placed around the patient's right lower extremity with ample webril padding  A time out was performed to confirm the correct patient, procedure and site with all parties in agreement  Following IV sedation, a cohen and digital block was performed consisting of 20 ml of 1% Lidocaine and 0 5% Bupivacaine in a 1:1 mixture  The foot was then scrubbed, prepped and draped in the usual aseptic manner  An esmarch bandage was utilized to exsangunate the patients foot and the tourniquet was then inflated  The esmarch bandage was removed and the foot was placed on the operating room table      Attention was directed to the right foot where a 2 5 cm linear incision was made over the first metatarsophalangeal joint  Dissection was continued through the subq layer, bleeders were cauterized as needed, and via blunt means dissection was made to the first interspace where a lateral release was made, the toe was then adducted following lateral release  The first MTPJ was incised and via sharp means the metatarsal head was then exposed and a moist sponge was now placed over the incision and attention was directed to the first TMJ      Using a 15 blade a skin incision was effected over the dorsal joint medial to the EHL, bleeders were cauterized, the communicating nerve was carefully mobilized and protected       Blunt dissection was then directed laterally where the deep peroneal nerve was identified and found to be compressed   With blunt and sharp means the nerve was decompressed proximally and distally      Attention was then redirected to the first TMJ where a deep incision was carried down to bone  The periosteum was longitudinally incised and reflected away medially and laterally from the underlying first tarsometatarsal joint  After obtaining adequate exposure a small joint distractor was placed in order to allow visualization of the cartilage  A bone curette was used to resect away the cartilage  This was followed by a bur after doing so subchondral bone was exposed  A sagittal saw was used to bevel the dorsolateral aspect of the medial cuneiform, in order to help reduce the intermetatarsal angle  fishscaled with an  Osteotome  Then a guidepin was used for temporary fixation and was placed in the plantar medial aspect of the medial cuneiform into the first metatarsal under imaging  While placing the K wire care was taken to plantarflex the first metatarsal and derotate it from valgus  Next a G-Snap! anchorage plate was applied utilizing manufacturers specifications  Positioning was confirmed with C-arm fluoroscopy  The remaining guidepin was removed  An exostectomy was performed of the medial 1st metatarsal  The site was flushed with sterile saline  It was noted that the intermetatarsal angle was reduced within normal limits and the hallux sat in a normal anatomical position negating the need for an osteotomy of the toe, with good range of motion of the first MPJ      All periosteal and capsular structures were closed using 3-0 Vicryl  The subcutaneous tissues were closed using 3-0 Vicryl in a running fashion  Subcuticular layers were closed using 4-0 monocryl in a running fashion as well  The surgical incisions were coated with benzoin and Steri-Strips were applied  Attention was then directed to the 2nd toe where a 4 cm linear incision was made encompassing the 2nd MPJ    The extensor tendon was then transected at the level of the PIPJ and retracted proximally beyond the 2nd MPJ to expose amputation  A transverse capsulotomy was performed exposing met head  A Mcglamry elevator was then utilized to free the met head from its plantar adhesions  A Weil osteotomy was then performed with sagittal saw and the osteotomy site was fixated with a 2 0 x 12 mm snap-off screws  attention was next directed to the middle phalanx in which a reamer was utilized from [employer] system  A reamer was then utilized for the proximal phalanx creating a cup and ball  This position was confirmed with c-arm and noted to be excellent  All periosteal and capsular structures were closed using 2-0 Vicryl  The subcutaneous tissues were closed using 3-0 Vicryl in a running fashion  Subcuticular layers were closed using 4-0 monocryl in a running fashion as well  The surgical incisions were coated with benzoin and Steri-Strips were applied  A postoperative injection consisting of 10 ml of 0 5% Bupivacaine was performed  The incision site was dressed with Betadine soaked adaptic, 4x4 gauze, and ABD  This was then covered with a Pablo, webril and an ACE wrap  The tourniquet was deflated and normal hyperemic flush was noted to all digits  The patient tolerated the procedure and anesthesia well and was transported to the PACU with vital signs stable  Dr Stephen Mcelroy was present during the entire procedure and participated in all key aspects  SIGNATURE: Cass Morrison DPM  DATE: July 17, 2020  TIME: 6:23 PM      Portions of the record may have been created with voice recognition software  Occasional wrong word or "sound a like" substitutions may have occurred due to the inherent limitations of voice recognition software  Read the chart carefully and recognize, using context, where substitutions have occurred

## 2020-07-17 NOTE — INTERVAL H&P NOTE
H&P reviewed  After examining the patient I find no changes in the patients condition since the H&P had been written      Vitals:    07/17/20 1152   BP: 135/71   Pulse: 70   Resp: 16   Temp: (!) 96 9 °F (36 1 °C)   SpO2: 96%

## 2020-07-17 NOTE — ANESTHESIA PREPROCEDURE EVALUATION
Review of Systems/Medical History  Patient summary reviewed  Chart reviewed  History of anesthetic complications PONV    Cardiovascular  Negative cardio ROS    Pulmonary  Negative pulmonary ROS        GI/Hepatic  Negative GI/hepatic ROS          Negative  ROS        Endo/Other  History of thyroid disease , hypothyroidism,      GYN  Negative gynecology ROS          Hematology  Negative hematology ROS      Musculoskeletal  Back pain ,   Comment: Lumbar fusion      Neurology  Negative neurology ROS      Psychology   Negative psychology ROS              Physical Exam    Airway    Mallampati score: II  TM Distance: >3 FB  Neck ROM: full     Dental   No notable dental hx     Cardiovascular  Comment: Negative ROS, Rhythm: regular, Rate: normal, Cardiovascular exam normal    Pulmonary  Pulmonary exam normal Breath sounds clear to auscultation,     Other Findings        Anesthesia Plan  ASA Score- 2     Anesthesia Type- general with ASA Monitors  Additional Monitors:   Airway Plan: LMA  Comment: LMA vs TIVA  Patient is very sensitive to narcotics-PONV  She prefers that we avoid narcotics  I told her we would try, but we may need to use a minimal amount        Plan Factors-    Induction- intravenous  Postoperative Plan-     Informed Consent- Anesthetic plan and risks discussed with patient

## 2020-07-17 NOTE — DISCHARGE SUMMARY
Discharge Summary Outpatient Procedure Podiatry -   Robert Moser 63 y o  female MRN: [id number]  Unit/Bed#: OR POOL Encounter: [id number]    Admission Date: 7/17/2020     Admitting Diagnosis: Hallux valgus (acquired), right foot [M20 11]  Bunion of right foot [M21 611]  Other hammer toe(s) (acquired), right foot [M20 41]    Discharge Diagnosis: same    Procedures Performed: Sammie Dae NIELSEN M T J , W/LATERAL RELEASE AT 1st MPJ:   2nd MET WEIL OSTEOTOMY:   2nd HAMMERTOE PIPJ ARTHODESIS:     Complications: none    Condition at Discharge: stable    Discharge instructions/Information to patient and family:   See after visit summary for information provided to patient and family  Provisions for Follow-Up Care/Important appointments:  See after visit summary for information related to follow-up care and any pertinent home health orders  Discharge Medications:  See after visit summary for reconciled discharge medications provided to patient and family

## 2020-07-17 NOTE — DISCHARGE INSTRUCTIONS
Dr Opal Xie    1  Take your prescribed medication as directed  2  Upon arrival at home, lie down and elevate your surgical foot on 2 pillows  3  Remain quiet, off your feet as much as possible, for the first 24-48 hours  This is when your feet first swell and may become painful  After 48 hours you may begin limited walking following these restrictions:   Nonweightbearinbg to surgical foot  4  Drink large quantities of water  Consume no alcohol  Continue a well-balanced diet  5  Report any unusual discomfort or fever to this office  6  A limited amount of discomfort and swelling is to be expected  In some cases the skin may take on a bruised appearance  The surgical solution that was applied to your foot prior to the operation is dark in color and the operation site may appear to be oozing when it actually is not  7  A slight amount of blood is to be expected, and is no cause for alarm  Do not remove the dressings  If there is active bleeding and if the bleeding persists, add additional gauze to the bandage, apply direct pressure, elevate your feet and call this office  8  Do not get the dressings wet  As regular bathing may be inconvenient, sponge baths are recommended  9  When anesthesia wears off and if any discomfort should be present, apply an ice pack directly over the operated area for 15 minute intervals for several hours or until the pain leaves  (USE IN EXCESS OF 15 MINUTES COULD CAUSE FROSTBITE)  Do not use hot water bags or electric pads  A convenient icepack can be made by placing ice cubes in a plastic bag and covering this with a towel  10  If necessary, take a mild laxative before retiring  11  Wear your special open shoes anytime you put weight on your foot, even if it is just to walk to the bathroom and back  It will probably be 2 or 3 weeks before you will be permitted to try regular shoes    12  Having performed the operation, we are interested in a prompt recovery  Please cooperate by following the above instructions  13  Please call to confirm your post-op appointment or call with any other questions

## 2020-07-18 VITALS
SYSTOLIC BLOOD PRESSURE: 112 MMHG | HEART RATE: 66 BPM | HEIGHT: 66 IN | OXYGEN SATURATION: 98 % | WEIGHT: 126 LBS | TEMPERATURE: 98.2 F | BODY MASS INDEX: 20.25 KG/M2 | RESPIRATION RATE: 18 BRPM | DIASTOLIC BLOOD PRESSURE: 63 MMHG

## 2020-07-18 PROBLEM — R41.82 ALTERED MENTAL STATUS: Status: ACTIVE | Noted: 2020-07-18

## 2020-07-18 PROBLEM — E21.0 PRIMARY HYPERPARATHYROIDISM (HCC): Status: ACTIVE | Noted: 2020-07-18

## 2020-07-18 LAB
ALBUMIN SERPL BCP-MCNC: 3 G/DL (ref 3.5–5)
ALP SERPL-CCNC: 102 U/L (ref 46–116)
ALT SERPL W P-5'-P-CCNC: 17 U/L (ref 12–78)
AMPHETAMINES SERPL QL SCN: NEGATIVE
ANION GAP SERPL CALCULATED.3IONS-SCNC: 12 MMOL/L (ref 4–13)
ANION GAP SERPL CALCULATED.3IONS-SCNC: 9 MMOL/L (ref 4–13)
APAP SERPL-MCNC: <2 UG/ML (ref 10–20)
AST SERPL W P-5'-P-CCNC: 18 U/L (ref 5–45)
BARBITURATES UR QL: NEGATIVE
BENZODIAZ UR QL: POSITIVE
BILIRUB SERPL-MCNC: 0.26 MG/DL (ref 0.2–1)
BUN SERPL-MCNC: 14 MG/DL (ref 5–25)
BUN SERPL-MCNC: 15 MG/DL (ref 5–25)
CALCIUM SERPL-MCNC: 7.4 MG/DL (ref 8.3–10.1)
CALCIUM SERPL-MCNC: 7.6 MG/DL (ref 8.3–10.1)
CHLORIDE SERPL-SCNC: 108 MMOL/L (ref 100–108)
CHLORIDE SERPL-SCNC: 109 MMOL/L (ref 100–108)
CO2 SERPL-SCNC: 23 MMOL/L (ref 21–32)
CO2 SERPL-SCNC: 25 MMOL/L (ref 21–32)
COCAINE UR QL: NEGATIVE
CREAT SERPL-MCNC: 0.8 MG/DL (ref 0.6–1.3)
CREAT SERPL-MCNC: 0.81 MG/DL (ref 0.6–1.3)
ERYTHROCYTE [DISTWIDTH] IN BLOOD BY AUTOMATED COUNT: 11.1 % (ref 11.6–15.1)
ERYTHROCYTE [DISTWIDTH] IN BLOOD BY AUTOMATED COUNT: 11.2 % (ref 11.6–15.1)
ETHANOL SERPL-MCNC: 5 MG/DL (ref 0–3)
GFR SERPL CREATININE-BSD FRML MDRD: 78 ML/MIN/1.73SQ M
GFR SERPL CREATININE-BSD FRML MDRD: 79 ML/MIN/1.73SQ M
GLUCOSE P FAST SERPL-MCNC: 113 MG/DL (ref 65–99)
GLUCOSE SERPL-MCNC: 113 MG/DL (ref 65–140)
GLUCOSE SERPL-MCNC: 133 MG/DL (ref 65–140)
GLUCOSE SERPL-MCNC: 151 MG/DL (ref 65–140)
HCT VFR BLD AUTO: 41 % (ref 34.8–46.1)
HCT VFR BLD AUTO: 41 % (ref 34.8–46.1)
HGB BLD-MCNC: 13 G/DL (ref 11.5–15.4)
HGB BLD-MCNC: 13.1 G/DL (ref 11.5–15.4)
MCH RBC QN AUTO: 31.7 PG (ref 26.8–34.3)
MCH RBC QN AUTO: 31.7 PG (ref 26.8–34.3)
MCHC RBC AUTO-ENTMCNC: 31.7 G/DL (ref 31.4–37.4)
MCHC RBC AUTO-ENTMCNC: 32 G/DL (ref 31.4–37.4)
MCV RBC AUTO: 100 FL (ref 82–98)
MCV RBC AUTO: 99 FL (ref 82–98)
METHADONE UR QL: NEGATIVE
OPIATES UR QL SCN: NEGATIVE
OXYCODONE+OXYMORPHONE UR QL SCN: NEGATIVE
PCP UR QL: NEGATIVE
PLATELET # BLD AUTO: 214 THOUSANDS/UL (ref 149–390)
PLATELET # BLD AUTO: 216 THOUSANDS/UL (ref 149–390)
PMV BLD AUTO: 11 FL (ref 8.9–12.7)
PMV BLD AUTO: 11.2 FL (ref 8.9–12.7)
POTASSIUM SERPL-SCNC: 4.1 MMOL/L (ref 3.5–5.3)
POTASSIUM SERPL-SCNC: 4.7 MMOL/L (ref 3.5–5.3)
PROT SERPL-MCNC: 6 G/DL (ref 6.4–8.2)
PTH-INTACT SERPL-MCNC: 90.8 PG/ML (ref 18.4–80.1)
RBC # BLD AUTO: 4.1 MILLION/UL (ref 3.81–5.12)
RBC # BLD AUTO: 4.13 MILLION/UL (ref 3.81–5.12)
SALICYLATES SERPL-MCNC: <3 MG/DL (ref 3–20)
SODIUM SERPL-SCNC: 143 MMOL/L (ref 136–145)
SODIUM SERPL-SCNC: 143 MMOL/L (ref 136–145)
THC UR QL: NEGATIVE
WBC # BLD AUTO: 13.91 THOUSAND/UL (ref 4.31–10.16)
WBC # BLD AUTO: 7.78 THOUSAND/UL (ref 4.31–10.16)

## 2020-07-18 PROCEDURE — 80048 BASIC METABOLIC PNL TOTAL CA: CPT | Performed by: STUDENT IN AN ORGANIZED HEALTH CARE EDUCATION/TRAINING PROGRAM

## 2020-07-18 PROCEDURE — 80329 ANALGESICS NON-OPIOID 1 OR 2: CPT | Performed by: PHYSICIAN ASSISTANT

## 2020-07-18 PROCEDURE — 99244 OFF/OP CNSLTJ NEW/EST MOD 40: CPT | Performed by: PHYSICIAN ASSISTANT

## 2020-07-18 PROCEDURE — 82948 REAGENT STRIP/BLOOD GLUCOSE: CPT

## 2020-07-18 PROCEDURE — 85027 COMPLETE CBC AUTOMATED: CPT | Performed by: STUDENT IN AN ORGANIZED HEALTH CARE EDUCATION/TRAINING PROGRAM

## 2020-07-18 PROCEDURE — 85027 COMPLETE CBC AUTOMATED: CPT | Performed by: PHYSICIAN ASSISTANT

## 2020-07-18 PROCEDURE — 80307 DRUG TEST PRSMV CHEM ANLYZR: CPT | Performed by: PHYSICIAN ASSISTANT

## 2020-07-18 PROCEDURE — 80320 DRUG SCREEN QUANTALCOHOLS: CPT | Performed by: PHYSICIAN ASSISTANT

## 2020-07-18 PROCEDURE — 80053 COMPREHEN METABOLIC PANEL: CPT | Performed by: PHYSICIAN ASSISTANT

## 2020-07-18 RX ADMIN — ENOXAPARIN SODIUM 40 MG: 40 INJECTION SUBCUTANEOUS at 09:21

## 2020-07-18 RX ADMIN — SODIUM CHLORIDE 100 ML/HR: 0.9 INJECTION, SOLUTION INTRAVENOUS at 10:27

## 2020-07-18 RX ADMIN — ACETAMINOPHEN 650 MG: 325 TABLET ORAL at 13:01

## 2020-07-18 NOTE — CONSULTS
Consult- Robert Moser 1955, 63 y o  female MRN: [id number]    Unit/Bed#: E5 -01 Encounter: [id number]    Primary Care Provider: Cayetano Hackett PA-C   Date and time admitted to hospital: 7/17/2020 11:10 AM      Inpatient consult to Internal Medicine  Consult performed by: Delisa Oconnell PA-C  Consult ordered by: Linda Dudley DPM                Primary hyperparathyroidism [facility])  Assessment & Plan  S/p partial parathyroidectomy 1 week pta  Calcium levels corrected for hypoalbuminemia at 8 2  PTH per podiatry pending  Op f/u with surgeon as scheduled, continue levothyroxine    Altered mental status  Assessment & Plan  Inappropriate affect/giddiness w/rightward gaze horizontal nystagmus and dysmetria w/finger to nose testing  -Ct head negative for ischemia/hemorrhage/midline shift  -suspect this is due to IM ketamine doses x 2 perioperatively, however unclear why pt has duration of s/sx  -start neurochecks q 4h  -check cmp for electrolyte abnormality, uds/coma panel      Headache   B/l frontal headache  Likely TTH  No focal neurodeficits/fevers  apap prn    Hammertoe/bunion   S/p corrective surgery POD #0 wrapped and w/ace dressing surgical shoe at bedside    Mgmt per 1* team        VTE Prophylaxis: Enoxaparin (Lovenox)  / reason for no mechanical VTE prophylaxis vte screen     Recommendations for Discharge:  ·     Counseling / Coordination of Care Time: 45 minutes  Greater than 50% of total time spent on patient counseling and coordination of care  Collaboration of Care: Were Recommendations Directly Discussed with Primary Treatment Team? - Yes     History of Present Illness:    Robert Moser is a 63 y o  female who is originally admitted to the podiatry service due to bunion and hammer toe  We are consulted for ams  Pt is a pleasant 63 w/o hx of primary hyperparathyroidism, hypothyroid coming to hospital for elective surgical repair of her podiatric issues    She was in her normal state of health preprocedure  Due to ponv she did not receive narcotics and received ketamine and versed/propofol for sedation  Post procedure she had some slurred speech and AMS/confusion  There was concern for nystagmus in PACU per nursing staff  These symptoms persisted and she was admitted for further momitoring  Review of Systems:    Review of Systems    Past Medical and Surgical History:     Past Medical History:   Diagnosis Date    Bunion, right foot     and hammertoe    History of lumbar fusion     History of transfusion     As a     Hypothyroidism     OA (osteoarthritis)     PONV (postoperative nausea and vomiting)     Presence of surgical incision     left side neck- parathyroid surgery 20    Wears glasses        Past Surgical History:   Procedure Laterality Date    BACK SURGERY      Lumbar fusion with rods    COLONOSCOPY      PARATHYROIDECTOMY      partial x 2    [place] CMBND ANTERPOST COLPORRAPHY W/CYSTO N/A 2017    Procedure: ANTERIOR AND POSTERIOR COLPORRHAPHY; GRAFT;  Surgeon: Melvin Brooks MD;  [facility];  Service: UroGynecology           FL CYSTOURETHROSCOPY N/A 2017    Procedure: [employer];  Surgeon: Melvin Brooks MD;  [facility];  Service: UroGynecology           FL REVAGINAL PROLAPSE,SACROSP LIG N/A 2017    Procedure: COLPOPEXY VAGINAL EXTRAPERITONEAL (VEC); Surgeon: Melvin Brooks MD;  [facility];  Service: UroGynecology           FL SLING OPER STRES INCONTINENCE N/A 2017    Procedure: INSERTION PUBOVAGINAL SLING (FEMALE); Surgeon: Melvin Brooks MD;  [facility];  Service: UroGynecology           REPLACEMENT TOTAL KNEE BILATERAL Bilateral        Meds/Allergies:    all medications and allergies reviewed    Allergies:    Allergies   Allergen Reactions    Codeine GI Intolerance    Percocet [Oxycodone-Acetaminophen] GI Intolerance    Vicodin [Hydrocodone-Acetaminophen] GI Intolerance       Social History:     Marital Status: /Civil Union    Substance Use History:   Social History     Substance and Sexual Activity   Alcohol Use No     Social History     Tobacco Use   Smoking Status Never Smoker   Smokeless Tobacco Never Used     Social History     Substance and Sexual Activity   Drug Use No       Family History:        Physical Exam:     Vitals:   Blood Pressure: 119/78 (07/17/20 2351)  Pulse: 73 (07/17/20 2351)  Temperature: (!) 97 3 °F (36 3 °C) (07/17/20 2351)  Temp Source: Temporal (07/17/20 2351)  Respirations: 16 (07/17/20 2351)  Height: 5' 6" (167 6 cm) (07/17/20 1152)  Weight - Scale: 57 2 kg (126 lb) (07/17/20 1152)  SpO2: 97 % (07/17/20 2351)    Physical Exam   Constitutional: She appears well-developed and well-nourished  No distress  HENT:   Head: Normocephalic and atraumatic  Right Ear: External ear normal    Left Ear: External ear normal    Eyes: Pupils are equal, round, and reactive to light  EOM is with rightward horizontal nystagmus  No palsy   Neck: Normal range of motion  Neck supple  Cardiovascular: Normal rate, regular rhythm and normal heart sounds  Exam reveals no gallop and no friction rub  No murmur heard  Pulmonary/Chest: Effort normal  No respiratory distress  She has no wheezes  She has no rales  Abdominal: She exhibits no distension and no mass  There is no tenderness  There is no guarding  Musculoskeletal:   No neck pain or meingimus   Neurological: She is alert  A cranial nerve deficit and sensory deficit (RLE decreased sensation) is present  She exhibits normal muscle tone  Coordination abnormal    Speech is slightly slurred and pressured  Mild b/l dysmetria w/finger to nose testing   Skin: Skin is warm and dry  She is not diaphoretic  Psychiatric:   Affect is somewhat giddy/inappropriate per conversation   Vitals reviewed        (  Be Sure to Include Physical Exam: Delete this entire line when you have entered your exam)    Additional Data:     Lab Results: I have personally reviewed pertinent reports  Results from last 7 days   Lab Units 07/18/20  0000   WBC Thousand/uL 7 78   HEMOGLOBIN g/dL 13 1   HEMATOCRIT % 41 0   PLATELETS Thousands/uL 214     Results from last 7 days   Lab Units 07/18/20  0000   SODIUM mmol/L 143   POTASSIUM mmol/L 4 1   CHLORIDE mmol/L 108   CO2 mmol/L 23   BUN mg/dL 15   CREATININE mg/dL 0 81   ANION GAP mmol/L 12   CALCIUM mg/dL 7 4*   ALBUMIN g/dL 3 0*   TOTAL BILIRUBIN mg/dL 0 26   ALK PHOS U/L 102   ALT U/L 17   AST U/L 18   GLUCOSE RANDOM mg/dL 151*             Lab Results   Component Value Date/Time    HGBA1C 5 2 05/02/2017 10:24 AM               Imaging: I have personally reviewed pertinent reports  CT head wo contrast   Final Result by Jak Mcneal MD (07/18 0008)      1  No acute intracranial hemorrhage, midline shift, or mass effect  2   Chronic small vessel ischemic changes  Workstation performed: [id number]         XR foot 3+ vw right    (Results Pending)       EKG, Pathology, and Other Studies Reviewed on Admission:   · EKG:     ** Please Note: This note has been constructed using a voice recognition system   **

## 2020-07-18 NOTE — ANESTHESIA POSTPROCEDURE EVALUATION
Post-Op Assessment Note    CV Status:  Stable  Pain Score: 3    Pain management: adequate     Mental Status:  Alert and awake   Hydration Status:  Euvolemic and stable   PONV Controlled:  Controlled   Airway Patency:  Patent   Post Op Vitals Reviewed: Yes      Staff: Anesthesiologist           /68 (07/17/20 2154)    Temp (!) 97 1 °F (36 2 °C) (07/17/20 2154)    Pulse 82 (07/17/20 2154)   Resp 16 (07/17/20 2154)    SpO2 96 % (07/17/20 2154)

## 2020-07-18 NOTE — DISCHARGE SUMMARY
Discharge Summary -   Maria Teresa Murry 63 y o  female MRN: [id number]  Unit/Bed#: E5 -01 Encounter: [id number]    Admission Date: 7/17/2020     Admitting Diagnosis: Hallux valgus (acquired), right foot [M20 11]  Bunion of right foot [M21 611]  Other hammer toe(s) (acquired), right foot [M20 41]    HPI: 64 y/o female s/p left foot lapidus and 2nd digit HT, and weil osteotomy procedure  Pt was admitted to be under observation for following symptom: Nystagmus, Slurring speech  Her  reports that patient is not behaving like herself  Pt she is alert and oriented at this time  Procedures Performed: Wilder Regulus T M T J , W/LATERAL RELEASE AT 1st MPJ:   2nd MET WEIL OSTEOTOMY:   2nd HAMMERTOE PIPJ ARTHODESIS:     Hospital Course: 64 y/o female s/p left foot lapidus and 2nd digit HT, and weil osteotomy procedure was admitted observation secondary to nystagmus and slurred speech  Head CT was ordered to rule out any bleeding or hemorrhagic events  Internal Medicine was consulted to assist in patient care, the believe patient's altered mental status was secondary to ketamine  Patient was placed on DVT prophylaxis Lovenox along with with 4 hour neuro check  This morning patient was evaluated this morning by RADHA and Dr Stephen Mcelroy patient feels like herself again and would like to go home today  Denies nausea vomiting fever chills shortness of breath upon discharge  Significant Findings, Care, Treatment and Services Provided:  See the hospital course    Complications:  None    Discharge Diagnosis:  As above    Condition at Discharge: stable     Discharge instructions/Information to patient and family:   See after visit summary for information provided to patient and family  Provisions for Follow-Up Care/Important appointments:    See after visit summary for information related to follow-up care and any pertinent home health orders        Disposition: Home    Planned Readmission: No    Discharge Statement   I spent 30 minutes discharging the patient  This time was spent on the day of discharge  I had direct contact with the patient on the day of discharge  The details of this patient's discharge     Discharge Medications:  See after visit summary for reconciled discharge medications provided to patient and family

## 2020-07-18 NOTE — NURSING NOTE
Patient given discharge instructions  Patient left with surgical shoe and crutches  Patient left with all belongings

## 2020-07-18 NOTE — ASSESSMENT & PLAN NOTE
S/p partial parathyroidectomy 1 week pta    Calcium levels corrected for hypoalbuminemia at 8 2  PTH per podiatry pending  Op f/u with surgeon as scheduled, continue levothyroxine

## 2020-07-18 NOTE — ASSESSMENT & PLAN NOTE
Inappropriate affect/giddiness w/rightward gaze horizontal nystagmus and dysmetria w/finger to nose testing  -Ct head negative for ischemia/hemorrhage/midline shift  -suspect this is due to IM ketamine doses x 2 perioperatively, however unclear why pt has duration of s/sx      -start neurochecks q 4h  -check cmp for electrolyte abnormality, uds/coma panel

## 2020-07-18 NOTE — H&P
H&P Exam - Rennis Every 63 y o  female MRN: [id number]    Unit/Bed#: E5 -01 Encounter: [id number]    Assessment:  62 y/o female s/p left foot lapidus and 2nd digit HT, and weil osteotomy procedure   1  Nystagmus  2  Slurring speech     Plan:  - Pt is being admitted for observation over night due to Nystagmus and slurring speech   - Head CT was ordered  - SLIM consulted  - Continue all home pharmacological agent,   - NWB left LE  - DVT prophylaxis: Lovenox    History of Present Illness   62 y/o female s/p left foot lapidus and 2nd digit HT, and weil osteotomy procedure  Pt was admitted to be under observation for following symptom: Nystagmus, Slurring speech  Her  reports that patient is not behaving like herself  Pt she is alert and oriented at this time  Review of Systems   Constitutional: Negative for chills  Eyes: Positive for visual disturbance  Skin: Negative for color change  Neurological: Positive for speech difficulty  Psychiatric/Behavioral: Positive for confusion         Historical Information   Past Medical History:   Diagnosis Date    Bunion, right foot     and hammertoe    History of lumbar fusion     History of transfusion     As a     Hypothyroidism     OA (osteoarthritis)     PONV (postoperative nausea and vomiting)     Presence of surgical incision     left side neck- parathyroid surgery 20    Wears glasses      Past Surgical History:   Procedure Laterality Date    BACK SURGERY      Lumbar fusion with rods    COLONOSCOPY      PARATHYROIDECTOMY      partial x 2    [place] CMBND ANTERPOST COLPORRAPHY W/CYSTO N/A 2017    Procedure: ANTERIOR AND POSTERIOR COLPORRHAPHY; GRAFT;  Surgeon: Dalia Carrero MD;  [facility];  Service: UroGynecology           AK CYSTOURETHROSCOPY N/A 2017    Procedure: Mi Merlos;  Surgeon: Dalia Carrero MD;  [facility];  Service: UroGynecology           [place] REVAGINAL PROLAPSE,SACROSP [address] N/A 2017 Procedure: COLPOPEXY VAGINAL EXTRAPERITONEAL (VEC); Surgeon: Yeny Sanchez MD;  [facility];  Service: UroGynecology           [place] SLING OPER STRES INCONTINENCE N/A 5/2/2017    Procedure: INSERTION PUBOVAGINAL SLING (FEMALE); Surgeon: Yeny Sanchez MD;  [facility];  Service: UroGynecology           REPLACEMENT TOTAL KNEE BILATERAL Bilateral 2012     Social History   Social History     Substance and Sexual Activity   Alcohol Use No     Social History     Substance and Sexual Activity   Drug Use No     Social History     Tobacco Use   Smoking Status Never Smoker   Smokeless Tobacco Never Used     E-Cigarette Use: Never User     E-Cigarette/Vaping Substances    Nicotine No     THC No     CBD No     Flavoring No     Other No     Unknown No        Family History: History reviewed  No pertinent family history      Meds/Allergies   all medications and allergies reviewed  Allergies   Allergen Reactions    Codeine GI Intolerance    Percocet [Oxycodone-Acetaminophen] GI Intolerance    Vicodin [Hydrocodone-Acetaminophen] GI Intolerance       Objective   First Vitals:   Blood Pressure: 135/71 (07/17/20 1152)  Pulse: 70 (07/17/20 1152)  Temperature: (!) 96 9 °F (36 1 °C) (07/17/20 1152)  Temp Source: Tympanic (07/17/20 1152)  Respirations: 16 (07/17/20 1152)  Height: 5' 6" (167 6 cm) (07/14/20 1254)  Weight - Scale: 57 2 kg (126 lb) (07/14/20 1254)  SpO2: 96 % (07/17/20 1152)    Current Vitals:   Blood Pressure: 111/68 (07/17/20 2154)  Pulse: 82 (07/17/20 2154)  Temperature: (!) 97 1 °F (36 2 °C) (07/17/20 2154)  Temp Source: Temporal (07/17/20 2154)  Respirations: 16 (07/17/20 2154)  Height: 5' 6" (167 6 cm) (07/17/20 1152)  Weight - Scale: 57 2 kg (126 lb) (07/17/20 1152)  SpO2: 96 % (07/17/20 2154)      Intake/Output Summary (Last 24 hours) at 7/17/2020 2158  Last data filed at 7/17/2020 2147  Gross per 24 hour   Intake 2700 ml   Output 250 ml   Net 2450 ml       Invasive Devices     Peripheral Intravenous Line            Peripheral IV 07/17/20 Right Hand less than 1 day                Physical Exam   Constitutional: She is oriented to person, place, and time  She appears well-nourished  No distress  HENT:   Head: Normocephalic  Musculoskeletal: Normal range of motion  Neurological: She is alert and oriented to person, place, and time  Skin: Capillary refill takes less than 2 seconds  She is not diaphoretic  Nursing note and vitals reviewed  Lab Results:   Imaging:   EKG, Pathology, and Other Studies:     Code Status: Level 1 - Full Code  Advance Directive and Living Will:      Power of :    POLST:      Counseling / Coordination of Care: Total floor / unit time spent today 15 minutes

## 2020-07-18 NOTE — PROGRESS NOTES
Progress Note - Podiatry  Doneta Grise 63 y o  female MRN: [id number]  Unit/Bed#: E5 -01 Encounter: [id number]    Assessment:  62 y/o female s/p left foot lapidus and 2nd digit HT, and weil osteotomy procedure (7/17/20) admitted overnight for observation due to altered mental status likely due to Ketamine pre-op    Plan:   Patient is alert and oriented, denies any complaints    Left foot dressings intact, no strike through bleeding noted   NWB to the LLE    Anticipate DC today to home pending SLIM clearance    Appreciate SLIM for medical management   Plan was discussed with Dr Jimmie Mendoza   Chief Complaint: No chief complaint on file  Subjective: 63 y o  female was seen and evaluated at bedside  Patient denies any complaints  States she is feeling better, no pain  Blood pressure 112/63, pulse 66, temperature 98 2 °F (36 8 °C), temperature source Temporal, resp  rate 18, height 5' 6" (1 676 m), weight 57 2 kg (126 lb), SpO2 98 %, not currently breastfeeding  Body mass index is 20 34 kg/m²  Physical Exam:   General: Alert, cooperative and no distress  Lower Extremities: Neurovascular status at baseline bilaterally, musculoskeletal function at baseline bilaterally, no calf tenderness bilaterally  Lab, Imaging and other studies:   Results from last 7 days   Lab Units 07/18/20  0528   WBC Thousand/uL 13 91*   HEMOGLOBIN g/dL 13 0   HEMATOCRIT % 41 0   PLATELETS Thousands/uL 216     Results from last 7 days   Lab Units 07/18/20  0528 07/18/20  0000   POTASSIUM mmol/L 4 7 4 1   CHLORIDE mmol/L 109* 108   CO2 mmol/L 25 23   BUN mg/dL 14 15   CREATININE mg/dL 0 80 0 81   CALCIUM mg/dL 7 6* 7 4*   ALK PHOS U/L  --  102   ALT U/L  --  17   AST U/L  --  18                     Imaging: I have personally reviewed pertinent films and reports in PACS  EKG, Pathology, and Other Studies: I have personally reviewed pertinent reports    VTE Pharmacologic Prophylaxis: Enoxaparin (Lovenox)    Portions of the record may have been created with voice recognition software  Occasional wrong word or "sound a like" substitutions may have occurred due to the inherent limitations of voice recognition software  Read the chart carefully and recognize, using context, where substitutions have occurred      Meredith Hale DPM

## 2020-07-18 NOTE — PROGRESS NOTES
Patient not seen but case was discussed with podiatry as well as nursing  The patient's mentation is back at baseline without deficits    Patient discharged prior to seen however based on review of notes and discussion would be medically stable for discharge

## 2020-07-21 LAB — INPATIENT: NORMAL

## 2021-03-10 DIAGNOSIS — Z23 ENCOUNTER FOR IMMUNIZATION: ICD-10-CM

## 2023-12-06 ENCOUNTER — APPOINTMENT (OUTPATIENT)
Dept: URGENT CARE | Age: 68
End: 2023-12-06

## (undated) DEVICE — GLOVE SRG LF STRL BGL SKNSNS 7.5 PF

## (undated) DEVICE — CURITY PLAIN PACKING STRIP: Brand: CURITY

## (undated) DEVICE — ELECTROSURGICAL DEVICE HOLSTER;FOR USE WITH MAXIMUM PEAK VOLTAGE OF 4000 V: Brand: FORCE TRIVERSE

## (undated) DEVICE — GLOVE INDICATOR PI UNDERGLOVE SZ 7 BLUE

## (undated) DEVICE — 3M™ STERI-DRAPE™ UNDER BUTTOCKS DRAPE WITH POUCH 1084: Brand: STERI-DRAPE™

## (undated) DEVICE — BAG URINE DRAINAGE 2000ML ANTI RFLX LF

## (undated) DEVICE — GLOVE INDICATOR PI UNDERGLOVE SZ 8 BLUE

## (undated) DEVICE — 2000CC GUARDIAN II: Brand: GUARDIAN

## (undated) DEVICE — 10FR FRAZIER SUCTION HANDLE: Brand: CARDINAL HEALTH

## (undated) DEVICE — SUT PDS II 2-0 CT-2 27 IN Z333H

## (undated) DEVICE — INTENDED FOR TISSUE SEPARATION, AND OTHER PROCEDURES THAT REQUIRE A SHARP SURGICAL BLADE TO PUNCTURE OR CUT.: Brand: BARD-PARKER SAFETY BLADES SIZE 11, STERILE

## (undated) DEVICE — CATH FOLEY 18FR 5ML 2 WAY SILICONE ELASTIMER

## (undated) DEVICE — DRAPE C-ARM X-RAY

## (undated) DEVICE — WEBRIL 6 IN UNSTERILE

## (undated) DEVICE — SUT VICRYL 3-0 SH 27 IN J416H

## (undated) DEVICE — ACE WRAP 6 IN UNSTERILE

## (undated) DEVICE — SUT VICRYL 0 CT-2 18 IN J727D

## (undated) DEVICE — BULB SYRINGE,IRRIGATION WITH PROTECTIVE CAP: Brand: DOVER

## (undated) DEVICE — Device

## (undated) DEVICE — PLUMEPEN PRO 10FT

## (undated) DEVICE — UNTHREADED GUIDE WIRE
Type: IMPLANTABLE DEVICE | Site: FOOT | Status: NON-FUNCTIONAL
Brand: FIXOS
Removed: 2020-07-17

## (undated) DEVICE — TUBING SUCTION 5MM X 12 FT

## (undated) DEVICE — SUT VICRYL 0 CT-1 36 IN J946H

## (undated) DEVICE — SUT VICRYL 3-0 PS-2 27 IN J427H

## (undated) DEVICE — GOWN,SLEEVE,STERILE,W/CSR WRAP,1/P: Brand: MEDLINE

## (undated) DEVICE — GLOVE SRG BIOGEL ECLIPSE 7

## (undated) DEVICE — ALLENTOWN DR  LUCENTE S LAP PK: Brand: CARDINAL HEALTH

## (undated) DEVICE — CURITY STRETCH BANDAGE: Brand: CURITY

## (undated) DEVICE — NEEDLE COUNTER LG W/RULER

## (undated) DEVICE — SYRINGE 10ML LL

## (undated) DEVICE — SCREWDRIVER BLADE T10 AO, SELF RETAINING: Brand: VARIAX

## (undated) DEVICE — GLOVE SRG BIOGEL 7.5

## (undated) DEVICE — X-RAY DETECTABLE SPONGES,16 PLY: Brand: VISTEC

## (undated) DEVICE — PREMIUM DRY TRAY LF: Brand: MEDLINE INDUSTRIES, INC.

## (undated) DEVICE — DRILL BIT, AO DIA2.6MM X 135MM, SCALED: Brand: VARIAX

## (undated) DEVICE — PAD CAST 4 IN COTTON NON STERILE

## (undated) DEVICE — BAG DECANTER

## (undated) DEVICE — SCD SEQUENTIAL COMPRESSION COMFORT SLEEVE MEDIUM KNEE LENGTH: Brand: KENDALL SCD

## (undated) DEVICE — OCCLUSIVE GAUZE STRIP,3% BISMUTH TRIBROMOPHENATE IN PETROLATUM BLEND: Brand: XEROFORM

## (undated) DEVICE — STRETCH BANDAGE: Brand: CURITY

## (undated) DEVICE — FLOSEAL MATRIX IS INDICATED IN SURGICAL PROCEDURES (OTHER THAN IN OPHTHALMIC) AS AN ADJUNCT TO HEMOSTASIS WHEN CONTROL OF BLEEDING BY LIGATURE OR CONVENTIONALPROCEDURES IS INEFFECTIVE OR IMPRACTICAL.: Brand: FLOSEAL HEMOSTATIC MATRIX

## (undated) DEVICE — ASTOUND STANDARD SURGICAL GOWN, XXL: Brand: CONVERTORS

## (undated) DEVICE — NEEDLE 18 G X 1 1/2

## (undated) DEVICE — STOCKINETTE REGULAR

## (undated) DEVICE — 3M™ STERI-STRIP™ REINFORCED ADHESIVE SKIN CLOSURES, R1546, 1/4 IN X 4 IN (6 MM X 100 MM), 10 STRIPS/ENVELOPE: Brand: 3M™ STERI-STRIP™

## (undated) DEVICE — CURITY NON-ADHERENT STRIPS: Brand: CURITY

## (undated) DEVICE — GAUZE SPONGES,16 PLY: Brand: CURITY

## (undated) DEVICE — STEINMANN PIN, SMOOTH
Type: IMPLANTABLE DEVICE | Site: FOOT | Status: NON-FUNCTIONAL
Brand: VARIAX
Removed: 2020-07-17

## (undated) DEVICE — SUT VICRYL 2-0 SH 27 IN UNDYED J417H

## (undated) DEVICE — MEDI-VAC YANKAUER SUCTION HANDLE W/BULBOUS AND CONTROL VENT: Brand: CARDINAL HEALTH

## (undated) DEVICE — CAUTERY TIP POLISHER: Brand: DEVON

## (undated) DEVICE — ASTOUND STANDARD SURGICAL GOWN, XL: Brand: CONVERTORS

## (undated) DEVICE — HOLDING PIN
Type: IMPLANTABLE DEVICE | Site: FOOT | Status: NON-FUNCTIONAL
Brand: ANCHORAGE
Removed: 2020-07-17

## (undated) DEVICE — SUT VICRYL 4-0 PS-2 27 IN J426H

## (undated) DEVICE — CAST PADDING 4 IN SYNTHETIC NON-STRL

## (undated) DEVICE — ACE WRAP 4 IN UNSTERILE

## (undated) DEVICE — CHLORAPREP HI-LITE 26ML ORANGE

## (undated) DEVICE — INTENDED FOR TISSUE SEPARATION, AND OTHER PROCEDURES THAT REQUIRE A SHARP SURGICAL BLADE TO PUNCTURE OR CUT.: Brand: BARD-PARKER ® CARBON RIB-BACK BLADES

## (undated) DEVICE — GLOVE INDICATOR PI UNDERGLOVE SZ 6.5 BLUE

## (undated) DEVICE — NEEDLE 25G X 1 1/2

## (undated) DEVICE — GLOVE SRG BIOGEL 6.5

## (undated) DEVICE — KIRSCHNER WIRE , L, TIPS TROCAR , SMOOTH
Type: IMPLANTABLE DEVICE | Site: FOOT | Status: NON-FUNCTIONAL
Removed: 2020-07-17

## (undated) DEVICE — 3M™ STERI-STRIP™ REINFORCED ADHESIVE SKIN CLOSURES, R1547, 1/2 IN X 4 IN (12 MM X 100 MM), 6 STRIPS/ENVELOPE: Brand: 3M™ STERI-STRIP™

## (undated) DEVICE — DRAPE FLUID WARMER (BIRD BATH)

## (undated) DEVICE — NEEDLE EPID TUOHY 18G X 3.5IN  WNG CLR LF

## (undated) DEVICE — ADHESIVE SKN CLSR HISTOACRYL FLEX 0.5ML LF

## (undated) DEVICE — SUCTION SCINTILLANT LIGHT

## (undated) DEVICE — REM POLYHESIVE ADULT PATIENT RETURN ELECTRODE: Brand: VALLEYLAB

## (undated) DEVICE — EXIDINE 4 PCT

## (undated) DEVICE — MASTISOL LIQ ADHESIVE 2/3ML

## (undated) DEVICE — NEEDLE HYPO 22G X 1-1/2 IN

## (undated) DEVICE — SUT VICRYL 4-0 SH 27 IN J415H

## (undated) DEVICE — SUT PROLENE 0 CT-2 30 IN 8412H

## (undated) DEVICE — GLOVE SRG BIOGEL 8

## (undated) DEVICE — SUT MONOCRYL 4-0 PS-2 27 IN Y426H

## (undated) DEVICE — BETHLEHEM UNIVERSAL  MIONR EXT: Brand: CARDINAL HEALTH

## (undated) DEVICE — REAMER FOR CROSS-PLATES: Brand: ANCHORAGE

## (undated) DEVICE — SUT VICRYL 3-0 REEL 54 IN J285G